# Patient Record
Sex: FEMALE | Race: WHITE | Employment: OTHER | URBAN - METROPOLITAN AREA
[De-identification: names, ages, dates, MRNs, and addresses within clinical notes are randomized per-mention and may not be internally consistent; named-entity substitution may affect disease eponyms.]

---

## 2017-01-01 ENCOUNTER — APPOINTMENT (OUTPATIENT)
Dept: GENERAL RADIOLOGY | Facility: HOSPITAL | Age: 82
DRG: 536 | End: 2017-01-01
Attending: EMERGENCY MEDICINE
Payer: MEDICARE

## 2017-01-01 ENCOUNTER — APPOINTMENT (OUTPATIENT)
Dept: CT IMAGING | Facility: HOSPITAL | Age: 82
DRG: 536 | End: 2017-01-01
Attending: EMERGENCY MEDICINE
Payer: MEDICARE

## 2017-01-01 ENCOUNTER — TELEPHONE (OUTPATIENT)
Dept: INTERNAL MEDICINE CLINIC | Facility: CLINIC | Age: 82
End: 2017-01-01

## 2017-01-01 ENCOUNTER — HOSPITAL ENCOUNTER (INPATIENT)
Facility: HOSPITAL | Age: 82
LOS: 4 days | Discharge: HOSPICE | DRG: 536 | End: 2017-01-01
Attending: EMERGENCY MEDICINE | Admitting: HOSPITALIST
Payer: MEDICARE

## 2017-01-01 ENCOUNTER — APPOINTMENT (OUTPATIENT)
Dept: CV DIAGNOSTICS | Facility: HOSPITAL | Age: 82
DRG: 536 | End: 2017-01-01
Attending: INTERNAL MEDICINE
Payer: MEDICARE

## 2017-01-01 ENCOUNTER — HOSPITAL ENCOUNTER (INPATIENT)
Facility: HOSPITAL | Age: 82
LOS: 1 days | DRG: 536 | End: 2017-01-01
Attending: HOSPITALIST | Admitting: HOSPITALIST
Payer: OTHER MISCELLANEOUS

## 2017-01-01 ENCOUNTER — OFFICE VISIT (OUTPATIENT)
Dept: INTERNAL MEDICINE CLINIC | Facility: CLINIC | Age: 82
End: 2017-01-01

## 2017-01-01 ENCOUNTER — APPOINTMENT (OUTPATIENT)
Dept: ULTRASOUND IMAGING | Facility: HOSPITAL | Age: 82
DRG: 536 | End: 2017-01-01
Attending: HOSPITALIST
Payer: MEDICARE

## 2017-01-01 VITALS
HEART RATE: 125 BPM | BODY MASS INDEX: 27.64 KG/M2 | DIASTOLIC BLOOD PRESSURE: 44 MMHG | SYSTOLIC BLOOD PRESSURE: 135 MMHG | RESPIRATION RATE: 20 BRPM | OXYGEN SATURATION: 93 % | TEMPERATURE: 98 F | WEIGHT: 172 LBS | HEIGHT: 66 IN

## 2017-01-01 VITALS
SYSTOLIC BLOOD PRESSURE: 124 MMHG | TEMPERATURE: 104 F | HEART RATE: 138 BPM | RESPIRATION RATE: 18 BRPM | DIASTOLIC BLOOD PRESSURE: 46 MMHG | OXYGEN SATURATION: 90 %

## 2017-01-01 VITALS
BODY MASS INDEX: 28.96 KG/M2 | HEART RATE: 58 BPM | DIASTOLIC BLOOD PRESSURE: 74 MMHG | HEIGHT: 65 IN | WEIGHT: 173.81 LBS | SYSTOLIC BLOOD PRESSURE: 146 MMHG

## 2017-01-01 DIAGNOSIS — R26.2 INABILITY TO AMBULATE DUE TO HIP: ICD-10-CM

## 2017-01-01 DIAGNOSIS — R29.6 FREQUENT FALLS: ICD-10-CM

## 2017-01-01 DIAGNOSIS — R54 FRAILTY: Primary | ICD-10-CM

## 2017-01-01 DIAGNOSIS — S32.401A CLOSED NONDISPLACED FRACTURE OF RIGHT ACETABULUM, UNSPECIFIED PORTION OF ACETABULUM, INITIAL ENCOUNTER (HCC): ICD-10-CM
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
## 2017-01-01 LAB
ANION GAP SERPL CALC-SCNC: 10 MMOL/L (ref 0–18)
ANION GAP SERPL CALC-SCNC: 13 MMOL/L (ref 0–18)
ANION GAP SERPL CALC-SCNC: 6 MMOL/L (ref 0–18)
ANION GAP SERPL CALC-SCNC: 7 MMOL/L (ref 0–18)
ANION GAP SERPL CALC-SCNC: 8 MMOL/L (ref 0–18)
ANION GAP SERPL CALC-SCNC: 9 MMOL/L (ref 0–18)
ANTIBODY SCREEN: NEGATIVE
APTT PPP: 22.4 SECONDS (ref 23.2–35.3)
APTT PPP: 24.7 SECONDS (ref 23.2–35.3)
BASOPHILS # BLD: 0 K/UL (ref 0–0.2)
BASOPHILS # BLD: 0.1 K/UL (ref 0–0.2)
BASOPHILS # BLD: 0.1 K/UL (ref 0–0.2)
BASOPHILS NFR BLD: 0 %
BASOPHILS NFR BLD: 0 %
BASOPHILS NFR BLD: 1 %
BILIRUB UR QL: NEGATIVE
BUN SERPL-MCNC: 13 MG/DL (ref 8–20)
BUN SERPL-MCNC: 15 MG/DL (ref 8–20)
BUN SERPL-MCNC: 15 MG/DL (ref 8–20)
BUN SERPL-MCNC: 18 MG/DL (ref 8–20)
BUN SERPL-MCNC: 23 MG/DL (ref 8–20)
BUN SERPL-MCNC: 28 MG/DL (ref 8–20)
BUN/CREAT SERPL: 20.6 (ref 10–20)
BUN/CREAT SERPL: 22.7 (ref 10–20)
BUN/CREAT SERPL: 23.4 (ref 10–20)
BUN/CREAT SERPL: 28.6 (ref 10–20)
BUN/CREAT SERPL: 31.1 (ref 10–20)
BUN/CREAT SERPL: 32.1 (ref 10–20)
CALCIUM SERPL-MCNC: 8.4 MG/DL (ref 8.5–10.5)
CALCIUM SERPL-MCNC: 8.5 MG/DL (ref 8.5–10.5)
CALCIUM SERPL-MCNC: 8.7 MG/DL (ref 8.5–10.5)
CALCIUM SERPL-MCNC: 9.1 MG/DL (ref 8.5–10.5)
CHLORIDE SERPL-SCNC: 103 MMOL/L (ref 95–110)
CHLORIDE SERPL-SCNC: 105 MMOL/L (ref 95–110)
CHLORIDE SERPL-SCNC: 105 MMOL/L (ref 95–110)
CHLORIDE SERPL-SCNC: 107 MMOL/L (ref 95–110)
CHLORIDE SERPL-SCNC: 108 MMOL/L (ref 95–110)
CHLORIDE SERPL-SCNC: 108 MMOL/L (ref 95–110)
CHOLEST SERPL-MCNC: 121 MG/DL (ref 110–200)
CLARITY UR: CLEAR
CO2 SERPL-SCNC: 19 MMOL/L (ref 22–32)
CO2 SERPL-SCNC: 24 MMOL/L (ref 22–32)
CO2 SERPL-SCNC: 25 MMOL/L (ref 22–32)
COLOR UR: YELLOW
CREAT SERPL-MCNC: 0.56 MG/DL (ref 0.5–1.5)
CREAT SERPL-MCNC: 0.63 MG/DL (ref 0.5–1.5)
CREAT SERPL-MCNC: 0.64 MG/DL (ref 0.5–1.5)
CREAT SERPL-MCNC: 0.66 MG/DL (ref 0.5–1.5)
CREAT SERPL-MCNC: 0.74 MG/DL (ref 0.5–1.5)
CREAT SERPL-MCNC: 0.98 MG/DL (ref 0.5–1.5)
EOSINOPHIL # BLD: 0 K/UL (ref 0–0.7)
EOSINOPHIL # BLD: 0 K/UL (ref 0–0.7)
EOSINOPHIL # BLD: 0.1 K/UL (ref 0–0.7)
EOSINOPHIL # BLD: 0.2 K/UL (ref 0–0.7)
EOSINOPHIL # BLD: 0.2 K/UL (ref 0–0.7)
EOSINOPHIL # BLD: 0.3 K/UL (ref 0–0.7)
EOSINOPHIL NFR BLD: 0 %
EOSINOPHIL NFR BLD: 0 %
EOSINOPHIL NFR BLD: 1 %
EOSINOPHIL NFR BLD: 2 %
EOSINOPHIL NFR BLD: 3 %
EOSINOPHIL NFR BLD: 4 %
ERYTHROCYTE [DISTWIDTH] IN BLOOD BY AUTOMATED COUNT: 15 % (ref 11–15)
ERYTHROCYTE [DISTWIDTH] IN BLOOD BY AUTOMATED COUNT: 15.1 % (ref 11–15)
ERYTHROCYTE [DISTWIDTH] IN BLOOD BY AUTOMATED COUNT: 15.1 % (ref 11–15)
ERYTHROCYTE [DISTWIDTH] IN BLOOD BY AUTOMATED COUNT: 15.2 % (ref 11–15)
ERYTHROCYTE [DISTWIDTH] IN BLOOD BY AUTOMATED COUNT: 15.4 % (ref 11–15)
ERYTHROCYTE [DISTWIDTH] IN BLOOD BY AUTOMATED COUNT: 15.5 % (ref 11–15)
GLUCOSE BLDC GLUCOMTR-MCNC: 110 MG/DL (ref 70–99)
GLUCOSE BLDC GLUCOMTR-MCNC: 113 MG/DL (ref 70–99)
GLUCOSE BLDC GLUCOMTR-MCNC: 115 MG/DL (ref 70–99)
GLUCOSE BLDC GLUCOMTR-MCNC: 116 MG/DL (ref 70–99)
GLUCOSE BLDC GLUCOMTR-MCNC: 116 MG/DL (ref 70–99)
GLUCOSE BLDC GLUCOMTR-MCNC: 117 MG/DL (ref 70–99)
GLUCOSE BLDC GLUCOMTR-MCNC: 118 MG/DL (ref 70–99)
GLUCOSE BLDC GLUCOMTR-MCNC: 118 MG/DL (ref 70–99)
GLUCOSE BLDC GLUCOMTR-MCNC: 120 MG/DL (ref 70–99)
GLUCOSE BLDC GLUCOMTR-MCNC: 120 MG/DL (ref 70–99)
GLUCOSE BLDC GLUCOMTR-MCNC: 123 MG/DL (ref 70–99)
GLUCOSE BLDC GLUCOMTR-MCNC: 128 MG/DL (ref 70–99)
GLUCOSE BLDC GLUCOMTR-MCNC: 130 MG/DL (ref 70–99)
GLUCOSE BLDC GLUCOMTR-MCNC: 135 MG/DL (ref 70–99)
GLUCOSE BLDC GLUCOMTR-MCNC: 96 MG/DL (ref 70–99)
GLUCOSE SERPL-MCNC: 108 MG/DL (ref 70–99)
GLUCOSE SERPL-MCNC: 120 MG/DL (ref 70–99)
GLUCOSE SERPL-MCNC: 134 MG/DL (ref 70–99)
GLUCOSE SERPL-MCNC: 163 MG/DL (ref 70–99)
GLUCOSE SERPL-MCNC: 165 MG/DL (ref 70–99)
GLUCOSE SERPL-MCNC: 191 MG/DL (ref 70–99)
HCT VFR BLD AUTO: 28.7 % (ref 35–48)
HCT VFR BLD AUTO: 30 % (ref 35–48)
HCT VFR BLD AUTO: 30 % (ref 35–48)
HCT VFR BLD AUTO: 30.1 % (ref 35–48)
HCT VFR BLD AUTO: 30.4 % (ref 35–48)
HCT VFR BLD AUTO: 37.4 % (ref 35–48)
HDLC SERPL-MCNC: 49 MG/DL
HGB BLD-MCNC: 10 G/DL (ref 12–16)
HGB BLD-MCNC: 10.1 G/DL (ref 12–16)
HGB BLD-MCNC: 12.2 G/DL (ref 12–16)
HGB BLD-MCNC: 9.3 G/DL (ref 12–16)
HGB BLD-MCNC: 9.8 G/DL (ref 12–16)
HGB BLD-MCNC: 9.9 G/DL (ref 12–16)
HGB UR QL STRIP.AUTO: NEGATIVE
INR BLD: 1 (ref 0.9–1.2)
INR BLD: 1.1 (ref 0.9–1.2)
KETONES UR-MCNC: NEGATIVE MG/DL
LDLC SERPL CALC-MCNC: 50 MG/DL (ref 0–99)
LEUKOCYTE ESTERASE UR QL STRIP.AUTO: NEGATIVE
LYMPHOCYTES # BLD: 0.3 K/UL (ref 1–4)
LYMPHOCYTES # BLD: 0.7 K/UL (ref 1–4)
LYMPHOCYTES # BLD: 0.8 K/UL (ref 1–4)
LYMPHOCYTES # BLD: 0.9 K/UL (ref 1–4)
LYMPHOCYTES # BLD: 1.1 K/UL (ref 1–4)
LYMPHOCYTES # BLD: 1.5 K/UL (ref 1–4)
LYMPHOCYTES NFR BLD: 10 %
LYMPHOCYTES NFR BLD: 16 %
LYMPHOCYTES NFR BLD: 3 %
LYMPHOCYTES NFR BLD: 7 %
LYMPHOCYTES NFR BLD: 9 %
LYMPHOCYTES NFR BLD: 9 %
MAGNESIUM SERPL-MCNC: 1.9 MG/DL (ref 1.8–2.5)
MCH RBC QN AUTO: 29.4 PG (ref 27–32)
MCH RBC QN AUTO: 29.5 PG (ref 27–32)
MCH RBC QN AUTO: 29.6 PG (ref 27–32)
MCH RBC QN AUTO: 29.9 PG (ref 27–32)
MCH RBC QN AUTO: 30.2 PG (ref 27–32)
MCH RBC QN AUTO: 30.2 PG (ref 27–32)
MCHC RBC AUTO-ENTMCNC: 32.5 G/DL (ref 32–37)
MCHC RBC AUTO-ENTMCNC: 32.5 G/DL (ref 32–37)
MCHC RBC AUTO-ENTMCNC: 32.6 G/DL (ref 32–37)
MCHC RBC AUTO-ENTMCNC: 33.1 G/DL (ref 32–37)
MCHC RBC AUTO-ENTMCNC: 33.3 G/DL (ref 32–37)
MCHC RBC AUTO-ENTMCNC: 33.3 G/DL (ref 32–37)
MCV RBC AUTO: 89.8 FL (ref 80–100)
MCV RBC AUTO: 90.1 FL (ref 80–100)
MCV RBC AUTO: 90.7 FL (ref 80–100)
MCV RBC AUTO: 90.7 FL (ref 80–100)
MCV RBC AUTO: 90.9 FL (ref 80–100)
MCV RBC AUTO: 91 FL (ref 80–100)
MONOCYTES # BLD: 0.6 K/UL (ref 0–1)
MONOCYTES # BLD: 0.7 K/UL (ref 0–1)
MONOCYTES # BLD: 0.9 K/UL (ref 0–1)
MONOCYTES # BLD: 0.9 K/UL (ref 0–1)
MONOCYTES # BLD: 1 K/UL (ref 0–1)
MONOCYTES # BLD: 1.1 K/UL (ref 0–1)
MONOCYTES NFR BLD: 10 %
MONOCYTES NFR BLD: 10 %
MONOCYTES NFR BLD: 7 %
MONOCYTES NFR BLD: 7 %
MONOCYTES NFR BLD: 8 %
MONOCYTES NFR BLD: 9 %
MRSA NASAL: NEGATIVE
NEUTROPHILS # BLD AUTO: 11.9 K/UL (ref 1.8–7.7)
NEUTROPHILS # BLD AUTO: 13.3 K/UL (ref 1.8–7.7)
NEUTROPHILS # BLD AUTO: 5 K/UL (ref 1.8–7.7)
NEUTROPHILS # BLD AUTO: 5.4 K/UL (ref 1.8–7.7)
NEUTROPHILS # BLD AUTO: 7.6 K/UL (ref 1.8–7.7)
NEUTROPHILS # BLD AUTO: 9.9 K/UL (ref 1.8–7.7)
NEUTROPHILS NFR BLD: 70 %
NEUTROPHILS NFR BLD: 77 %
NEUTROPHILS NFR BLD: 79 %
NEUTROPHILS NFR BLD: 82 %
NEUTROPHILS NFR BLD: 85 %
NEUTROPHILS NFR BLD: 91 %
NITRITE UR QL STRIP.AUTO: NEGATIVE
NONHDLC SERPL-MCNC: 72 MG/DL
OSMOLALITY UR CALC.SUM OF ELEC: 288 MOSM/KG (ref 275–295)
OSMOLALITY UR CALC.SUM OF ELEC: 288 MOSM/KG (ref 275–295)
OSMOLALITY UR CALC.SUM OF ELEC: 289 MOSM/KG (ref 275–295)
OSMOLALITY UR CALC.SUM OF ELEC: 291 MOSM/KG (ref 275–295)
OSMOLALITY UR CALC.SUM OF ELEC: 291 MOSM/KG (ref 275–295)
OSMOLALITY UR CALC.SUM OF ELEC: 299 MOSM/KG (ref 275–295)
PH UR: 5 [PH] (ref 5–8)
PLATELET # BLD AUTO: 160 K/UL (ref 140–400)
PLATELET # BLD AUTO: 166 K/UL (ref 140–400)
PLATELET # BLD AUTO: 172 K/UL (ref 140–400)
PLATELET # BLD AUTO: 196 K/UL (ref 140–400)
PLATELET # BLD AUTO: 245 K/UL (ref 140–400)
PLATELET # BLD AUTO: 256 K/UL (ref 140–400)
PMV BLD AUTO: 8.8 FL (ref 7.4–10.3)
PMV BLD AUTO: 8.9 FL (ref 7.4–10.3)
PMV BLD AUTO: 9 FL (ref 7.4–10.3)
PMV BLD AUTO: 9.1 FL (ref 7.4–10.3)
PMV BLD AUTO: 9.1 FL (ref 7.4–10.3)
PMV BLD AUTO: 9.3 FL (ref 7.4–10.3)
POTASSIUM SERPL-SCNC: 3.2 MMOL/L (ref 3.3–5.1)
POTASSIUM SERPL-SCNC: 3.7 MMOL/L (ref 3.3–5.1)
POTASSIUM SERPL-SCNC: 3.7 MMOL/L (ref 3.3–5.1)
POTASSIUM SERPL-SCNC: 3.9 MMOL/L (ref 3.3–5.1)
POTASSIUM SERPL-SCNC: 4 MMOL/L (ref 3.3–5.1)
POTASSIUM SERPL-SCNC: 4 MMOL/L (ref 3.3–5.1)
POTASSIUM SERPL-SCNC: 4.6 MMOL/L (ref 3.3–5.1)
PROT UR-MCNC: NEGATIVE MG/DL
PROTHROMBIN TIME: 12.7 SECONDS (ref 11.8–14.5)
PROTHROMBIN TIME: 13.5 SECONDS (ref 11.8–14.5)
RBC # BLD AUTO: 3.16 M/UL (ref 3.7–5.4)
RBC # BLD AUTO: 3.29 M/UL (ref 3.7–5.4)
RBC # BLD AUTO: 3.31 M/UL (ref 3.7–5.4)
RBC # BLD AUTO: 3.31 M/UL (ref 3.7–5.4)
RBC # BLD AUTO: 3.39 M/UL (ref 3.7–5.4)
RBC # BLD AUTO: 4.15 M/UL (ref 3.7–5.4)
RBC #/AREA URNS AUTO: 1 /HPF
RH BLOOD TYPE: POSITIVE
SODIUM SERPL-SCNC: 137 MMOL/L (ref 136–144)
SODIUM SERPL-SCNC: 137 MMOL/L (ref 136–144)
SODIUM SERPL-SCNC: 138 MMOL/L (ref 136–144)
SODIUM SERPL-SCNC: 139 MMOL/L (ref 136–144)
SP GR UR STRIP: 1.02 (ref 1–1.03)
STAPH A BY PCR: POSITIVE
TRIGL SERPL-MCNC: 112 MG/DL (ref 1–149)
TROPONIN I SERPL-MCNC: 0.01 NG/ML (ref ?–0.03)
TROPONIN I SERPL-MCNC: 0.09 NG/ML (ref ?–0.03)
TROPONIN I SERPL-MCNC: 0.13 NG/ML (ref ?–0.03)
UROBILINOGEN UR STRIP-ACNC: <2
VIT C UR-MCNC: 40 MG/DL
WBC # BLD AUTO: 11.7 K/UL (ref 4–11)
WBC # BLD AUTO: 13.2 K/UL (ref 4–11)
WBC # BLD AUTO: 16.2 K/UL (ref 4–11)
WBC # BLD AUTO: 7 K/UL (ref 4–11)
WBC # BLD AUTO: 7.2 K/UL (ref 4–11)
WBC # BLD AUTO: 9.6 K/UL (ref 4–11)
WBC #/AREA URNS AUTO: 1 /HPF

## 2017-01-01 PROCEDURE — 99239 HOSP IP/OBS DSCHRG MGMT >30: CPT | Performed by: HOSPITALIST

## 2017-01-01 PROCEDURE — 99214 OFFICE O/P EST MOD 30 MIN: CPT | Performed by: INTERNAL MEDICINE

## 2017-01-01 PROCEDURE — 99233 SBSQ HOSP IP/OBS HIGH 50: CPT | Performed by: HOSPITALIST

## 2017-01-01 PROCEDURE — 72125 CT NECK SPINE W/O DYE: CPT

## 2017-01-01 PROCEDURE — 99223 1ST HOSP IP/OBS HIGH 75: CPT | Performed by: HOSPITALIST

## 2017-01-01 PROCEDURE — 93306 TTE W/DOPPLER COMPLETE: CPT | Performed by: INTERNAL MEDICINE

## 2017-01-01 PROCEDURE — 72170 X-RAY EXAM OF PELVIS: CPT

## 2017-01-01 PROCEDURE — 72192 CT PELVIS W/O DYE: CPT

## 2017-01-01 PROCEDURE — G0463 HOSPITAL OUTPT CLINIC VISIT: HCPCS | Performed by: INTERNAL MEDICINE

## 2017-01-01 PROCEDURE — 70450 CT HEAD/BRAIN W/O DYE: CPT

## 2017-01-01 PROCEDURE — 93923 UPR/LXTR ART STDY 3+ LVLS: CPT

## 2017-01-01 PROCEDURE — 71010 XR CHEST AP PORTABLE  (CPT=71010): CPT

## 2017-01-01 PROCEDURE — 93306 TTE W/DOPPLER COMPLETE: CPT

## 2017-01-01 PROCEDURE — 99222 1ST HOSP IP/OBS MODERATE 55: CPT | Performed by: HOSPITALIST

## 2017-01-01 RX ORDER — SODIUM PHOSPHATE, DIBASIC AND SODIUM PHOSPHATE, MONOBASIC 7; 19 G/133ML; G/133ML
1 ENEMA RECTAL ONCE AS NEEDED
Status: ACTIVE | OUTPATIENT
Start: 2017-01-01 | End: 2017-01-01

## 2017-01-01 RX ORDER — SODIUM CHLORIDE 9 MG/ML
INJECTION, SOLUTION INTRAVENOUS CONTINUOUS
Status: DISCONTINUED | OUTPATIENT
Start: 2017-01-01 | End: 2017-01-01

## 2017-01-01 RX ORDER — LORAZEPAM 2 MG/ML
2 INJECTION INTRAMUSCULAR EVERY 4 HOURS PRN
Status: DISCONTINUED | OUTPATIENT
Start: 2017-01-01 | End: 2017-01-01

## 2017-01-01 RX ORDER — SODIUM CHLORIDE 0.9 % (FLUSH) 0.9 %
SYRINGE (ML) INJECTION
Status: COMPLETED
Start: 2017-01-01 | End: 2017-01-01

## 2017-01-01 RX ORDER — ACETAMINOPHEN 160 MG/5ML
650 SOLUTION ORAL EVERY 6 HOURS PRN
Status: DISCONTINUED | OUTPATIENT
Start: 2017-01-01 | End: 2017-01-01

## 2017-01-01 RX ORDER — SODIUM CHLORIDE 9 MG/ML
INJECTION, SOLUTION INTRAVENOUS
Status: COMPLETED
Start: 2017-01-01 | End: 2017-01-01

## 2017-01-01 RX ORDER — DEXTROSE MONOHYDRATE 25 G/50ML
50 INJECTION, SOLUTION INTRAVENOUS AS NEEDED
Status: DISCONTINUED | OUTPATIENT
Start: 2017-01-01 | End: 2017-01-01

## 2017-01-01 RX ORDER — SODIUM CHLORIDE 0.9 % (FLUSH) 0.9 %
10 SYRINGE (ML) INJECTION AS NEEDED
Status: DISCONTINUED | OUTPATIENT
Start: 2017-01-01 | End: 2017-01-01

## 2017-01-01 RX ORDER — HYDROMORPHONE HYDROCHLORIDE 1 MG/ML
0.5 INJECTION, SOLUTION INTRAMUSCULAR; INTRAVENOUS; SUBCUTANEOUS ONCE
Status: COMPLETED | OUTPATIENT
Start: 2017-01-01 | End: 2017-01-01

## 2017-01-01 RX ORDER — HYDROMORPHONE HYDROCHLORIDE 1 MG/ML
1 INJECTION, SOLUTION INTRAMUSCULAR; INTRAVENOUS; SUBCUTANEOUS EVERY 2 HOUR PRN
Status: DISCONTINUED | OUTPATIENT
Start: 2017-01-01 | End: 2017-01-01

## 2017-01-01 RX ORDER — ALBUTEROL SULFATE 2.5 MG/3ML
2.5 SOLUTION RESPIRATORY (INHALATION) EVERY 4 HOURS PRN
Status: CANCELLED | OUTPATIENT
Start: 2017-01-01

## 2017-01-01 RX ORDER — FUROSEMIDE 40 MG/1
40 TABLET ORAL EVERY 8 HOURS PRN
Status: DISCONTINUED | OUTPATIENT
Start: 2017-01-01 | End: 2017-01-01

## 2017-01-01 RX ORDER — HEPARIN SODIUM AND DEXTROSE 10000; 5 [USP'U]/100ML; G/100ML
INJECTION INTRAVENOUS CONTINUOUS
Status: DISCONTINUED | OUTPATIENT
Start: 2017-01-01 | End: 2017-01-01

## 2017-01-01 RX ORDER — LORAZEPAM 2 MG/ML
1 INJECTION INTRAMUSCULAR EVERY 4 HOURS PRN
Status: CANCELLED | OUTPATIENT
Start: 2017-01-01

## 2017-01-01 RX ORDER — HYDROMORPHONE HYDROCHLORIDE 1 MG/ML
1 INJECTION, SOLUTION INTRAMUSCULAR; INTRAVENOUS; SUBCUTANEOUS ONCE
Status: COMPLETED | OUTPATIENT
Start: 2017-01-01 | End: 2017-01-01

## 2017-01-01 RX ORDER — ALBUTEROL SULFATE 2.5 MG/3ML
2.5 SOLUTION RESPIRATORY (INHALATION) EVERY 4 HOURS PRN
Status: DISCONTINUED | OUTPATIENT
Start: 2017-01-01 | End: 2017-01-01

## 2017-01-01 RX ORDER — BISACODYL 10 MG
10 SUPPOSITORY, RECTAL RECTAL
Status: DISCONTINUED | OUTPATIENT
Start: 2017-01-01 | End: 2017-01-01

## 2017-01-01 RX ORDER — DIPHENHYDRAMINE HCL 25 MG
25 CAPSULE ORAL EVERY 4 HOURS PRN
Status: DISCONTINUED | OUTPATIENT
Start: 2017-01-01 | End: 2017-01-01

## 2017-01-01 RX ORDER — ACETAMINOPHEN 160 MG/5ML
650 SOLUTION ORAL EVERY 6 HOURS PRN
Status: CANCELLED | OUTPATIENT
Start: 2017-01-01

## 2017-01-01 RX ORDER — ATROPINE SULFATE 10 MG/ML
2 SOLUTION/ DROPS OPHTHALMIC EVERY 2 HOUR PRN
Status: DISCONTINUED | OUTPATIENT
Start: 2017-01-01 | End: 2017-01-01

## 2017-01-01 RX ORDER — SODIUM PHOSPHATE, DIBASIC AND SODIUM PHOSPHATE, MONOBASIC 7; 19 G/133ML; G/133ML
1 ENEMA RECTAL ONCE AS NEEDED
Status: DISCONTINUED | OUTPATIENT
Start: 2017-01-01 | End: 2017-01-01

## 2017-01-01 RX ORDER — SENNOSIDES 8.6 MG
650 CAPSULE ORAL 2 TIMES DAILY
Status: ON HOLD | COMMUNITY
End: 2017-01-01

## 2017-01-01 RX ORDER — SCOLOPAMINE TRANSDERMAL SYSTEM 1 MG/1
1 PATCH, EXTENDED RELEASE TRANSDERMAL
Status: CANCELLED | OUTPATIENT
Start: 2017-01-11

## 2017-01-01 RX ORDER — FUROSEMIDE 10 MG/ML
40 INJECTION INTRAMUSCULAR; INTRAVENOUS EVERY 8 HOURS PRN
Status: DISCONTINUED | OUTPATIENT
Start: 2017-01-01 | End: 2017-01-01

## 2017-01-01 RX ORDER — ONDANSETRON 2 MG/ML
4 INJECTION INTRAMUSCULAR; INTRAVENOUS EVERY 6 HOURS PRN
Status: CANCELLED | OUTPATIENT
Start: 2017-01-01

## 2017-01-01 RX ORDER — ONDANSETRON 4 MG/1
4 TABLET, ORALLY DISINTEGRATING ORAL EVERY 6 HOURS PRN
Status: CANCELLED | OUTPATIENT
Start: 2017-01-01

## 2017-01-01 RX ORDER — ACETAMINOPHEN 650 MG/1
650 SUPPOSITORY RECTAL EVERY 6 HOURS PRN
Status: DISCONTINUED | OUTPATIENT
Start: 2017-01-01 | End: 2017-01-01

## 2017-01-01 RX ORDER — POLYETHYLENE GLYCOL 3350 17 G/17G
17 POWDER, FOR SOLUTION ORAL DAILY PRN
Status: DISCONTINUED | OUTPATIENT
Start: 2017-01-01 | End: 2017-01-01

## 2017-01-01 RX ORDER — HEPARIN SODIUM 1000 [USP'U]/ML
80 INJECTION, SOLUTION INTRAVENOUS; SUBCUTANEOUS ONCE
Status: DISCONTINUED | OUTPATIENT
Start: 2017-01-01 | End: 2017-01-01

## 2017-01-01 RX ORDER — ONDANSETRON 2 MG/ML
4 INJECTION INTRAMUSCULAR; INTRAVENOUS EVERY 6 HOURS PRN
Status: DISCONTINUED | OUTPATIENT
Start: 2017-01-01 | End: 2017-01-01

## 2017-01-01 RX ORDER — ACETAMINOPHEN 650 MG/1
650 SUPPOSITORY RECTAL EVERY 6 HOURS SCHEDULED
Status: DISCONTINUED | OUTPATIENT
Start: 2017-01-01 | End: 2017-01-01

## 2017-01-01 RX ORDER — ATROPINE SULFATE 10 MG/ML
2 SOLUTION/ DROPS OPHTHALMIC EVERY 2 HOUR PRN
Status: CANCELLED | OUTPATIENT
Start: 2017-01-01

## 2017-01-01 RX ORDER — DIPHENHYDRAMINE HCL 25 MG
25 CAPSULE ORAL EVERY 4 HOURS PRN
Status: CANCELLED | OUTPATIENT
Start: 2017-01-01

## 2017-01-01 RX ORDER — DIAZEPAM 5 MG/ML
5 INJECTION, SOLUTION INTRAMUSCULAR; INTRAVENOUS EVERY 6 HOURS PRN
Status: DISCONTINUED | OUTPATIENT
Start: 2017-01-01 | End: 2017-01-01

## 2017-01-01 RX ORDER — SODIUM CHLORIDE 0.9 % (FLUSH) 0.9 %
SYRINGE (ML) INJECTION
Status: DISCONTINUED
Start: 2017-01-01 | End: 2017-01-01

## 2017-01-01 RX ORDER — ACETAMINOPHEN 160 MG/5ML
650 SOLUTION ORAL EVERY 6 HOURS SCHEDULED
Status: DISCONTINUED | OUTPATIENT
Start: 2017-01-01 | End: 2017-01-01

## 2017-01-01 RX ORDER — LORAZEPAM 2 MG/ML
1 INJECTION INTRAMUSCULAR ONCE
Status: COMPLETED | OUTPATIENT
Start: 2017-01-01 | End: 2017-01-01

## 2017-01-01 RX ORDER — HYDROMORPHONE HYDROCHLORIDE 1 MG/ML
2 INJECTION, SOLUTION INTRAMUSCULAR; INTRAVENOUS; SUBCUTANEOUS EVERY 2 HOUR PRN
Status: DISCONTINUED | OUTPATIENT
Start: 2017-01-01 | End: 2017-01-01

## 2017-01-01 RX ORDER — HEPARIN SODIUM 5000 [USP'U]/ML
5000 INJECTION, SOLUTION INTRAVENOUS; SUBCUTANEOUS EVERY 12 HOURS
Status: DISCONTINUED | OUTPATIENT
Start: 2017-01-01 | End: 2017-01-01

## 2017-01-01 RX ORDER — SCOLOPAMINE TRANSDERMAL SYSTEM 1 MG/1
1 PATCH, EXTENDED RELEASE TRANSDERMAL
Status: DISCONTINUED | OUTPATIENT
Start: 2017-01-11 | End: 2017-01-01

## 2017-01-01 RX ORDER — ONDANSETRON 4 MG/1
4 TABLET, ORALLY DISINTEGRATING ORAL EVERY 6 HOURS PRN
Status: DISCONTINUED | OUTPATIENT
Start: 2017-01-01 | End: 2017-01-01

## 2017-01-01 RX ORDER — ACETAMINOPHEN 325 MG/1
650 TABLET ORAL EVERY 6 HOURS PRN
Status: DISCONTINUED | OUTPATIENT
Start: 2017-01-01 | End: 2017-01-01

## 2017-01-01 RX ORDER — HYDROMORPHONE HYDROCHLORIDE 1 MG/ML
0.3 INJECTION, SOLUTION INTRAMUSCULAR; INTRAVENOUS; SUBCUTANEOUS
Status: DISCONTINUED | OUTPATIENT
Start: 2017-01-01 | End: 2017-01-01

## 2017-01-01 RX ORDER — LORAZEPAM 2 MG/ML
1 INJECTION INTRAMUSCULAR EVERY 4 HOURS PRN
Status: DISCONTINUED | OUTPATIENT
Start: 2017-01-01 | End: 2017-01-01

## 2017-01-01 RX ORDER — ACETAMINOPHEN 650 MG/1
650 SUPPOSITORY RECTAL EVERY 6 HOURS PRN
Status: CANCELLED | OUTPATIENT
Start: 2017-01-01

## 2017-01-01 RX ORDER — GLUCOSAMINE SULFATE 500 MG
1000 CAPSULE ORAL DAILY
Status: ON HOLD | COMMUNITY
End: 2017-01-01

## 2017-01-01 RX ORDER — FUROSEMIDE 10 MG/ML
40 INJECTION INTRAMUSCULAR; INTRAVENOUS EVERY 8 HOURS PRN
Status: CANCELLED | OUTPATIENT
Start: 2017-01-01

## 2017-01-01 RX ORDER — LORAZEPAM 2 MG/ML
0.5 INJECTION INTRAMUSCULAR EVERY 4 HOURS PRN
Status: CANCELLED | OUTPATIENT
Start: 2017-01-01

## 2017-01-01 RX ORDER — HYDROCODONE BITARTRATE AND ACETAMINOPHEN 5; 325 MG/1; MG/1
2 TABLET ORAL EVERY 6 HOURS PRN
Status: DISCONTINUED | OUTPATIENT
Start: 2017-01-01 | End: 2017-01-01

## 2017-01-01 RX ORDER — SCOLOPAMINE TRANSDERMAL SYSTEM 1 MG/1
1 PATCH, EXTENDED RELEASE TRANSDERMAL
Status: DISCONTINUED | OUTPATIENT
Start: 2017-01-01 | End: 2017-01-01

## 2017-01-01 RX ORDER — LORAZEPAM 2 MG/ML
0.5 INJECTION INTRAMUSCULAR EVERY 4 HOURS PRN
Status: DISCONTINUED | OUTPATIENT
Start: 2017-01-01 | End: 2017-01-01

## 2017-01-01 RX ORDER — HYDROMORPHONE HYDROCHLORIDE 1 MG/ML
0.5 INJECTION, SOLUTION INTRAMUSCULAR; INTRAVENOUS; SUBCUTANEOUS
Status: DISCONTINUED | OUTPATIENT
Start: 2017-01-01 | End: 2017-01-01

## 2017-01-01 RX ORDER — HYDROMORPHONE HYDROCHLORIDE 1 MG/ML
2 INJECTION, SOLUTION INTRAMUSCULAR; INTRAVENOUS; SUBCUTANEOUS EVERY 2 HOUR PRN
Status: CANCELLED | OUTPATIENT
Start: 2017-01-01

## 2017-01-01 RX ORDER — HYDROMORPHONE HYDROCHLORIDE 1 MG/ML
1 INJECTION, SOLUTION INTRAMUSCULAR; INTRAVENOUS; SUBCUTANEOUS EVERY 2 HOUR PRN
Status: CANCELLED | OUTPATIENT
Start: 2017-01-01

## 2017-01-01 RX ORDER — HYDROCODONE BITARTRATE AND ACETAMINOPHEN 5; 325 MG/1; MG/1
1 TABLET ORAL EVERY 6 HOURS PRN
Status: DISCONTINUED | OUTPATIENT
Start: 2017-01-01 | End: 2017-01-01

## 2017-01-01 RX ORDER — POTASSIUM CHLORIDE 14.9 MG/ML
20 INJECTION INTRAVENOUS ONCE
Status: COMPLETED | OUTPATIENT
Start: 2017-01-01 | End: 2017-01-01

## 2017-01-01 RX ORDER — VALSARTAN AND HYDROCHLOROTHIAZIDE 320; 12.5 MG/1; MG/1
1 TABLET, FILM COATED ORAL DAILY
Status: DISCONTINUED | OUTPATIENT
Start: 2017-01-01 | End: 2017-01-01 | Stop reason: RX

## 2017-01-01 RX ORDER — POTASSIUM CHLORIDE 20 MEQ/1
40 TABLET, EXTENDED RELEASE ORAL ONCE
Status: COMPLETED | OUTPATIENT
Start: 2017-01-01 | End: 2017-01-01

## 2017-01-01 RX ORDER — HEPARIN SODIUM AND DEXTROSE 10000; 5 [USP'U]/100ML; G/100ML
18 INJECTION INTRAVENOUS ONCE
Status: DISCONTINUED | OUTPATIENT
Start: 2017-01-01 | End: 2017-01-01

## 2017-01-01 RX ORDER — DEXTROSE AND SODIUM CHLORIDE 5; .45 G/100ML; G/100ML
INJECTION, SOLUTION INTRAVENOUS ONCE
Status: COMPLETED | OUTPATIENT
Start: 2017-01-01 | End: 2017-01-01

## 2017-01-01 RX ORDER — AMLODIPINE BESYLATE 10 MG/1
10 TABLET ORAL DAILY
Status: DISCONTINUED | OUTPATIENT
Start: 2017-01-01 | End: 2017-01-01

## 2017-01-01 RX ORDER — BISACODYL 10 MG
10 SUPPOSITORY, RECTAL RECTAL
Status: CANCELLED | OUTPATIENT
Start: 2017-01-01

## 2017-01-01 RX ORDER — MULTIPLE VITAMINS W/ MINERALS TAB 9MG-400MCG
1 TAB ORAL DAILY
Status: DISCONTINUED | OUTPATIENT
Start: 2017-01-01 | End: 2017-01-01

## 2017-01-01 RX ORDER — FUROSEMIDE 40 MG/1
40 TABLET ORAL EVERY 8 HOURS PRN
Status: CANCELLED | OUTPATIENT
Start: 2017-01-01

## 2017-01-01 RX ORDER — DIAZEPAM 5 MG/ML
2.5 INJECTION, SOLUTION INTRAMUSCULAR; INTRAVENOUS EVERY 6 HOURS PRN
Status: DISCONTINUED | OUTPATIENT
Start: 2017-01-01 | End: 2017-01-01

## 2017-01-01 RX ORDER — ASPIRIN 325 MG
325 TABLET ORAL DAILY
Status: DISCONTINUED | OUTPATIENT
Start: 2017-01-01 | End: 2017-01-01

## 2017-01-01 RX ORDER — SODIUM CHLORIDE 0.9 % (FLUSH) 0.9 %
SYRINGE (ML) INJECTION
Status: DISPENSED
Start: 2017-01-01 | End: 2017-01-01

## 2017-01-01 RX ORDER — SODIUM CHLORIDE 0.9 % (FLUSH) 0.9 %
10 SYRINGE (ML) INJECTION AS NEEDED
Status: CANCELLED | OUTPATIENT
Start: 2017-01-01

## 2017-01-01 RX ORDER — ATORVASTATIN CALCIUM 20 MG/1
20 TABLET, FILM COATED ORAL NIGHTLY
Status: DISCONTINUED | OUTPATIENT
Start: 2017-01-01 | End: 2017-01-01

## 2017-01-01 RX ORDER — LORAZEPAM 2 MG/ML
2 INJECTION INTRAMUSCULAR EVERY 4 HOURS PRN
Status: CANCELLED | OUTPATIENT
Start: 2017-01-01

## 2017-01-01 RX ORDER — DIAZEPAM 5 MG/ML
5 INJECTION, SOLUTION INTRAMUSCULAR; INTRAVENOUS EVERY 6 HOURS PRN
Status: CANCELLED | OUTPATIENT
Start: 2017-01-01

## 2017-01-01 RX ORDER — SODIUM PHOSPHATE, DIBASIC AND SODIUM PHOSPHATE, MONOBASIC 7; 19 G/133ML; G/133ML
1 ENEMA RECTAL ONCE AS NEEDED
Status: CANCELLED | OUTPATIENT
Start: 2017-01-01 | End: 2017-01-01

## 2017-01-01 RX ORDER — POLYVINYL ALCOHOL 14 MG/ML
2 SOLUTION/ DROPS OPHTHALMIC
Status: DISCONTINUED | OUTPATIENT
Start: 2017-01-01 | End: 2017-01-01

## 2017-01-03 NOTE — PROGRESS NOTES
Radu Maxwell is a 80year old female. Patient presents with:  ER F/U: Pt was seen at the ER on 12/25 due to a fall    HPI:   Ms. Hugo Ball presents this morning, accompanied by her , for hospital follow-up.     On December 25, while attempting t needed. Disp: 20 tablet Rfl: 0   Buffered Aspirin 325 MG Oral Tab Take 1 tablet (325 mg total) by mouth daily.  Disp: 30 tablet Rfl: 0   METOPROLOL TARTRATE 100 MG Oral Tab TAKE ONE TABLET BY MOUTH TWICE DAILY Disp: 180 tablet Rfl: 1   Valsartan-Hydrochloro m)  Wt 173 lb 12.8 oz (78.835 kg)  BMI 28.92 kg/m2  LUNGS: Resonant to percussion and clear to auscultation without crackles or wheezes  CARDIAC: Rhythm irregularly irregular S1 S2 normal with harsh aortic systolic murmur, with 1-1+ pitting edema in the [facility]

## 2017-01-03 NOTE — PATIENT INSTRUCTIONS
Continue aspirin and remain off warfarin. Continue your other medications. Continue to use a walker always when walking. Return visit in 3 months.

## 2017-01-04 PROBLEM — R26.2 INABILITY TO AMBULATE DUE TO HIP: Status: ACTIVE | Noted: 2017-01-01
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
## 2017-01-04 PROBLEM — S32.401A CLOSED NONDISPLACED FRACTURE OF RIGHT ACETABULUM, UNSPECIFIED PORTION OF ACETABULUM, INITIAL ENCOUNTER (HCC): Status: ACTIVE | Noted: 2017-01-01

## 2017-01-04 NOTE — BH PROGRESS NOTE
ADMISSION NOTE    80year old female with h/o severe AS, Afib dm htn multiple falls presents with fall with hip pain and chest pain. Available medical records partially reviewed. Dictation to follow.     Carlos Gomez M.D.  1/4/2017

## 2017-01-04 NOTE — ED PROVIDER NOTES
Patient Seen in: [facility] AND [facility] Emergency Department    History   Patient presents with:  Fall (musculoskeletal, neurologic)    Stated Complaint: fall     HPI    79 yo F with PMH CAD, aortic stenosis, DM, HTN, HL, afib off warfarin secondary to recurren HYDROcodone-acetaminophen 5-325 MG Oral Tab,  Take 1 tablet by mouth every 4 (four) hours as needed. Buffered Aspirin 325 MG Oral Tab,  Take 1 tablet (325 mg total) by mouth daily.    METOPROLOL TARTRATE 100 MG Oral Tab,  TAKE ONE TABLET BY MOUTH TWICE DA equally reactive. Neck: Neck supple. In c-collar; no midline c-spine tenderness/stepoff/deformity. Cardiovascular: RRR. Pulmonary/Chest: Effort normal. CTAB. Nontender. Abdominal: Soft. Nontender.   Musculoskeletal: RLE shortened/rotated, 1+ DP/PT with In process                   Please view results for these tests on the individual orders.    BLOOD TYPE, ABO AND RH D   ANTIBODY SCREEN   RAINBOW DRAW GOLD   RAINBOW DRAW DARK GREEN   RAINBOW DRAW LAVENDER TALL (BNP)     Ct Brain Or Head ( (xdl=27545)    1/3/2017  PROCEDURE: CT SPINE CERVICAL (CPT=72125)  COMPARISON: [facility], CT SPINE CERVICAL (CPT=72125), 12/25/2016, 11:03. INDICATIONS: Fall, head and neck trauma. Evaluate for injury.   TECHNIQUE: Multi-planar CT images w pneumonia. Diffuse interstitial thickening noted throughout both lungs which can be seen with atypical infection, edema, or lung fibrosis. Appears similar to prior. Mild to moderate cardiomegaly, exaggerated by AP projection.   Moderate tortuosity and a margins. Possible mild thickening is incidentally noted of a loop of small bowel in the right lower quadrant. Adjacent possible soft tissue stranding is seen. Appearance is concerning for possible incidental enteritis.       Severe degenerative changes a pain with secondary limitation of AROM. CT obtained and notable for nondisplaced acetabular fracture. Given same, will admit to [address] [place] [address] coverage Dr. Matthew Jacobs for PCP Dr. Bernardino Barry with primary ortho Dr. Jean Bates notified of CT-demonstrated acetabular fracture.     Paul Gardner

## 2017-01-04 NOTE — H&P
[facility]    PATIENT'S NAME: Terri RAMON   ATTENDING PHYSICIAN: Darek Rivas MD   PATIENT ACCOUNT#:   [de-identified]    LOCATION:  [facility]  MEDICAL RECORD #:   [id number]       YOB: 1933  ADMISSION DATE:       01/03/2017    H dobutamine stress echo, which was normal in 2012. Severe aortic stenosis, echo in March of last year revealed an ejection fraction of 55% to 60%. Atrial fibrillation, not on anticoagulation due to frequent falls.   Type 2 diabetes mellitus, apparently die The patient reports her appetite has been good. She denies any fevers or chills. No nausea or vomiting. Otherwise, no additional pertinent positives or negatives on the 12-point review of systems except as listed in the History of Present Illness. of 24.  Her white count was 16.2 with a hemoglobin of 12.2 and a platelet count of 689,447. There were 82% neutrophils and 9 lymphocytes. Initial troponin was 0.01, second troponin was 0.09, third troponin was 0.13. EKG was previously mentioned. MD  d: 01/04/2017 09:40:03  t: 01/04/2017 10:52:58  [facility] [id number]  JUDY/

## 2017-01-04 NOTE — H&P
ASSESSMENT/PLAN:     impression:     1. Right hip fracture post fall  2. Severe aortic stenosis with chronic atrial fibrillation.   She has known coronary artery disease and now has an elevated troponin with some chest pain last night  3 hypertension RAINBOW DRAW DARK GREEN; Standing  -     RAINBOW DRAW LAVENDER TALL (BNP);  Standing  -     EKG 12 Lead Once; Standing  -     Troponin I, 0 Hour; Standing  -     Type and screen STAT; Standing  -     Type and screen STAT; Standing  -     CT PELVIS (CPT= into the vein every 3 (three) hours as needed for severe pain.    -     Continue villar catheter Once; Standing  -     Cancel: Remote Telemetry; Standing  -     TELE BOX ORDER; Standing  -     Cardiac monitoring Continuous; Standing  -     TROPONIN I, 2 HOUR once.   -     Cardiac diet Calorie Restriction/Carb Controlled: 1800 kcal/60 grams; Standing  -     Hypoglycemia Protocol Until Discontinued; Standing  -     Nursing communication - home diabetic meds; Standing  -     Patient education (specify) Once; Bhupinder Azar • Lumbar spinal stenosis      MRI April 2005   • Osteoarthritis    • Macular degeneration      Legal blindness.  Avastin injection   • Obesity    • Embolism, arterial, leg, right [facility]) November 2014     Severe ischemia RLE s/p right femoral and tibial art NEURO/PSYCH:alert and oriented. Normal affect. CV: PALP:PMI not displaced; no lifts, thrills or rubs. AUSC: Irregular rhythm, normal S1, S2 without S3; diminished A2 with harsh systolic ejection murmur.  CAROTIDS:carotid pulses normal. ABD AORTA:not enla

## 2017-01-04 NOTE — PROGRESS NOTES
ADDENDUM: please see Dr. Rolon Counter H&P from earlier today. D/w Dr. Reyna Lino who recommends holding off on surgery due to very high cardiac risk. Will con't pain control and await ortho recommendations. Await echo.  D/w Dr. Merced Aguilar, will hold off on surgery for

## 2017-01-04 NOTE — CONSULTS
[facility]    PATIENT'S NAME: Nitesh Ryan YENNY   ATTENDING PHYSICIAN: Jimi Nougeira MD   CONSULTING PHYSICIAN: Noa Oliver.  MD Tawana   PATIENT ACCOUNT#:   [de-identified]    LOCATION:  [facility]  MEDICAL RECORD #:   [id number]       DATE OF BIRTH:  [birth year] radiographic studies discussed above were reviewed by me in detail.       PAST MEDICAL HISTORY:  Extensive and includes severe aortic stenosis, atrial fibrillation, type 2 diabetes mellitus, hypertension, dyslipidemia, colon polyps, primary osteoarthritis o light touch sensation to all areas of the right foot is intact. Mobility exam of the right hip was not carried out given the severity of radiographic findings. LABORATORY DATA:  Admission laboratory studies showed MRSA screening to be negative.   MSSA preoperative hospital stay. I will confer with Cardiology and Dr. Jono Robertson on a regular basis as well. Thank you for alerting me of this patient's admission and orthopedic surgical issues that have prompted this admission. Dictated By Alda Gilliam,

## 2017-01-05 NOTE — PROGRESS NOTES
[facility]    Progress Note    Basil Saleem Patient Status:  Inpatient    1933 MRN [id number]   Location [facility] 1SW Attending Hussein Cifuentes MD   Hosp Day # 2 PCP Félix Bass MD     Subjective:  Pain better controlle Left hip total arthroplasty. 4. Moderately advanced osteoarthritis right hip. 5. Osteitis pubis. 6. Osteoarthritis lower lumbar spine. 7. Demineralization. 8. Atherosclerosis. 9. Distended urinary bladder.  10. A preliminary report was submitted and there i compared with ECG of 01/04/2017 00:43:49 No change Electronically signed on 01/04/2017 at 11:01 by Josh Wakefield 12-lead    1/4/2017  ECG Report  Interpretation  -------------------------- Atrial flutter-fibrillation -Poor R-wave progression -nons heparin sq          Rena Azar MD

## 2017-01-05 NOTE — PROGRESS NOTES
ASSESSMENT/PLAN:     impression:     1.  Right hip fracture post fall  2.  Severe aortic stenosis with chronic atrial fibrillation.  She has known coronary artery disease and now has an elevated troponin with some chest pain last night  3 hypertension  4 Obesity    • Embolism, arterial, leg, right [facility]) November 2014     Severe ischemia RLE s/p right femoral and tibial artery thromboembolectomy   • Recurrent falls      Coumadin stopped 12-16   • Essential hypertension    • Arrhythmia    • Coronary atherosc otherwise negative and/or non-contributory, except as described above.       PHYSICAL EXAM:   /46 mmHg  Pulse 65  Temp(Src) 98.3 °F (36.8 °C) (Oral)  Resp 18  Ht 5' 6\" (1.676 m)  Wt 172 lb (78.019 kg)  BMI 27.77 kg/m2  SpO2 97%  Wt Readings from Last

## 2017-01-05 NOTE — PROGRESS NOTES
PROGRESS NOTE / ORTHO SURG: I SPOKE WITH DR. Hawkins Im THIS AM REGARDING THIS PATIENT. WE DISCUSSED THE FACT THAT RIGHT HIP REPLACEMENT WAS NOT AN URGENT MATTER HERE.  THE PATIENT'S SEVERE RIGHT HIP OA COULD BE MANAGED BY MODIFICATION OF MOBILITY( ie. Aleksander Denton

## 2017-01-06 NOTE — PROGRESS NOTES
[place] [facility] - [facility]    Progress Note    Deo Torres Patient Status:  Inpatient    1933 MRN [id number]   Location [facility] 1SW Attending Linda Benitez MD   [address] Day # 3 PCP Kaelyn Manning MD     Subjective:  Felt dizzy after gett PTT  24.7   --    --    --    --    --    INR  1.0   --    --   1.1   --    --    TROP   --   0.01  0.09*  0.13*   --    --        Xr Pelvis (1 View) (cpt=72170)    1/4/2017  CONCLUSION: 1. No obvious change of December 25, 2016. 2. No acute fracture.  3. there is agreement without major discrepancies. Ekg 12-lead    1/4/2017  ECG Report  Interpretation  -------------------------- Atrial flutter-fibrillation -Poor R-wave progression -nonspecific -consider old anterior infarct.  ABNORMAL RHYTHM When c stenosis  - echo result reviewed  - per cardiology    Chest pain and elevated troponin  - managing medically for now    HTN - con't meds and monitor    Dyslipidemia - statin    Recurrent falls    Macular degeneration with legal blindness    Obesity Body ma

## 2017-01-06 NOTE — PROGRESS NOTES
ASSESSMENT/PLAN:     impression:     1.  Right hip fracture post fall  2.  Severe aortic stenosis with chronic atrial fibrillation.  She has known coronary artery disease and now has an elevated troponin with some chest pain last night  3 hypertension  4 • Macular degeneration      Legal blindness.  Avastin injection   • Obesity    • Embolism, arterial, leg, right [facility]) November 2014     Severe ischemia RLE s/p right femoral and tibial artery thromboembolectomy   • Recurrent falls      Coumadin stopped 12 are noted. Ten point review of systems has been performed and is otherwise negative and/or non-contributory, except as described above.       PHYSICAL EXAM:   /57 mmHg  Pulse 60  Temp(Src) 98 °F (36.7 °C) (Oral)  Resp 18  Ht 5' 6\" (1.676 m)  Wt 172 l

## 2017-01-07 NOTE — PROGRESS NOTES
[facility]  Cardiology Progress Note  TAVR team evaluation    Kin Clonts Patient Status:  Inpatient    1933 MRN [id number]   Location [facility] 4W/SW/SE Attending Mark Moon MD   [facility] Day # 4 PCP Judithe Ormond, MD          1. 1328   Gross per 24 hour   Intake    450 ml   Output      0 ml   Net    450 ml       Last 3 Weights  01/03/17 1959 : 172 lb (78.019 kg)  01/03/17 0930 : 173 lb 12.8 oz (78.835 kg)  12/25/16 1453 : 170 lb 1.6 oz (77.157 kg)  12/25/16 1029 : 174 lb (78.926 k Medications:  diazepam (VALIUM) injection 2.5 mg 2.5 mg Intravenous Q6H PRN   HYDROcodone-acetaminophen (NORCO) 5-325 MG per tab 1 tablet 1 tablet Oral Q6H PRN   Or      HYDROcodone-acetaminophen (NORCO) 5-325 MG per tab 2 tablet 2 tablet Oral Q6H PRN   Mi

## 2017-01-07 NOTE — PROGRESS NOTES
[place] [facility] - [facility]    Progress Note    Deo Torres Patient Status:  Inpatient    1933 MRN [id number]   Location [facility] 1SW Attending Linda Benitez MD   [facility] Day # 4 PCP Kaelyn Manning MD     Subjective:  Felt dizzy after gett 7. Demineralization. 8. Atherosclerosis. 9. Distended urinary bladder. 10. A preliminary report was submitted and there is agreement without major discrepancies. Ct Brain Or Head ([id number])    1/3/2017  CONCLUSION:  1. No acute intracranial process. 12-lead    1/4/2017  ECG Report  Interpretation  -------------------------- Atrial flutter-fibrillation -Poor R-wave progression -nonspecific -consider old anterior infarct.  ABNORMAL RHYTHM When compared with ECG of 01/03/2017 20:54:01 No significant owens full code     DVT proph: heparin sq          Johnie Jameson MD

## 2017-01-07 NOTE — PLAN OF CARE
NEUROLOGICAL - ADULT    • Achieves maximal functionality and self care Not Progressing        PAIN - ADULT    • Verbalizes/displays adequate comfort level or patient's stated pain goal Not Progressing          NEUROLOGICAL - ADULT    • Achieves stable or i

## 2017-01-08 NOTE — PROGRESS NOTES
[place] [facility] - [facility]    Progress Note    Elizabeth Elk Patient Status:  Inpatient    1933 MRN [id number]   Location [facility] 1SW Attending Bartolo Bamberger, MD   [address] Day # 5 PCP Kimberly Reyes MD     Subjective:  Sleeping, not respond spine. 7. Demineralization. 8. Atherosclerosis. 9. Distended urinary bladder. 10. A preliminary report was submitted and there is agreement without major discrepancies. Ct Brain Or Head ([id number])    1/3/2017  CONCLUSION:  1.   No acute intracranial pro 12-lead    1/4/2017  ECG Report  Interpretation  -------------------------- Atrial flutter-fibrillation -Poor R-wave progression -nonspecific -consider old anterior infarct.  ABNORMAL RHYTHM When compared with ECG of 01/03/2017 20:54:01 No significant owens blindness    Obesity Body mass index is 27.77 kg/(m^2).      Goals of care   - DNR status  - hospice care    DVT proph: d/c heparin drip      Spent >35 min      Diego Michelle MD

## 2017-01-08 NOTE — HOSPICE RN NOTE
Hospice initiation visit    Received Referral from intake to have information session with patient family.   Received patient in bed, patient is lethargic, minimally responsive, does not appear to be in any pain or distress at this time as patient just rece

## 2017-01-08 NOTE — PROGRESS NOTES
[facility] AND [facility]  Cardiology Progress Note    Jesus Manuel Vences Patient Status:  Inpatient    1933 MRN [id number]   Location [facility] 4W/SW/SE Attending Rah Alvarez MD   [facility] Day # 5 PCP Octavia Shaw MD        1.  Right hip fracture pos encounter ([facility]      Objective:   Temp: 97.9 °F (36.6 °C)  Pulse: 125  Resp: 20  BP: 135/44 mmHg    Intake/Output:     Intake/Output Summary (Last 24 hours) at 01/08/17 0657  Last data filed at 01/07/17 1328   Gross per 24 hour   Intake    450 ml   Output HYDRATE  Isradipine                  Comment:Other reaction(s): ISRADIPINE  Lisinopril                  Comment:Other reaction(s): LISINOPRIL    Medications:    Current Facility-Administered Medications:  diazepam (VALIUM) injection 5 mg 5 mg Intravenous Q

## 2017-01-08 NOTE — PLAN OF CARE
NEUROLOGICAL - ADULT    • Achieves stable or improved neurological status Not Progressing        PAIN - ADULT    • Verbalizes/displays adequate comfort level or patient's stated pain goal Not Progressing          Patient with increased pain and anxiety thr

## 2017-01-08 NOTE — PLAN OF CARE
PAIN - ADULT    • Verbalizes/displays adequate comfort level or patient's stated pain goal Progressing        Patient Centered Care    • Patient preferences are identified and integrated in the patient's plan of care Progressing          Patient admitted t

## 2017-01-08 NOTE — DISCHARGE SUMMARY
[facility]    Discharge Summary    Anibal Boyer Patient Status:  Inpatient    1933 MRN [id number]   Location [facility] 4W/SW/SE Attending No att. providers found   Hosp Day # 5 PCP Petar Tucker MD     Date of Admissi atraumatic  Neck:  Supple, symmetrical  Cardiac:  irregular  Pulmonary:  Clear to auscultation bilaterally, respirations unlabored  Gastrointestinal:  Soft, non-tender, normal bowel sounds  Musculoskeletal:  No joint swelling  Extremities:  Left leg cold,

## 2017-01-08 NOTE — CONSULTS
[facility]  Vascular Surgery Consultation    Praneeth Patterson Patient Status:  Inpatient    1933 MRN [id number]   Location [facility] 4W/SW/SE Attending Susanne Briceno MD   [facility] Day # 5 PCP Kenyetta Ratliff MD     History of Present Illness posterior descending artery.   The patient has had spinal stenosis, obesity, macular degeneration (with legal blindness), severe hip disease with a left total hip replacement in 2001 and arthroplasty in 2008, and the above-mentioned chronic atrial fibrillat thromboembolectomy    TOTAL HIP REPLACEMENT Left October 2001    Comment LEFT HIP     CHOLECYSTECTOMY      HERNIA SURGERY       Family History   Problem Relation Age of Onset   • Diabetes Mother    • Diabetes Maternal Grandmother    • Stroke Father      Iker Coreas multivitamin with minerals (ADULT) tab 1 tablet, 1 tablet, Oral, Daily  •  Atorvastatin Calcium (LIPITOR) tab 20 mg, 20 mg, Oral, Nightly  •  acetaminophen (TYLENOL) tab 650 mg, 650 mg, Oral, Q6H PRN  •  valsartan (DIOVAN) 320 mg, hydrochlorothiazide (MICR of the leg and posterior lateral thigh consistent with severe ischemia.     Laboratory Data:    Lab Results  Component Value Date   WBC 13.2 01/08/2017   HGB 9.8 01/08/2017   HCT 30.1 01/08/2017    01/08/2017   CREATSERUM 0.98 01/08/2017   BUN 28 01/ chooses hospice care as primary therapy. The patient will be made comfortable. A hospice consultation has been requested. Thank you very much for this consultation.     Jody Maynard MD  1/8/2017  9:27 AM

## 2017-01-08 NOTE — PLAN OF CARE
Patient being switched to inpatient hospice. Had a rough night involving severe pain in left leg, in which no pulses were found via doppler and leg was pale and cold. Stat arterial study also confirmed no pulses. Vascular consult ordered.  Dr. Namita Valdes also

## 2017-01-08 NOTE — DISCHARGE PLANNING
SW received order in regards to hospice. SW sent referral to [facility]. SW called [facility] on call line to inform of referral for possible eval today. SW informed RN of this.     Theresa Share, [address]

## 2017-01-09 NOTE — HOSPICE RN NOTE
Hospice RN Rounding Note    HPI: Kyle Vieira is a(n) 80year old female with a history of severe aortic stenosis and atrial fibrillation with multiple recurrent falls.  She presents after a fall at home.  She had an acute R acetabular fracture.  The etc.    Kahlil Bello RN BSN [facility]  Nurse Liaison, [facility]  [phone] at [address] [place] [address]

## 2017-01-09 NOTE — H&P
[place] [facility] - [facility]    History & Physical    Jeison Hastings Patient Status:  Inpatient    1933 MRN [id number]   Location [facility] 4W/SW/SE Attending Livier Ovalles MD   Hosp Day # 1 PCP Sharon Leija MD     [address] [place] [address] thromboembolectomy   • Recurrent falls      Coumadin stopped 12-16   • Essential hypertension    • Arrhythmia    • Coronary atherosclerosis    • High blood pressure    • High cholesterol    • Diabetes ([address]      diet controlled , and no medicines    • Lamar 3.16*  3.31*   MCV  90.7  90.9   MCH  29.5  29.6   MCHC  32.5  32.5   RDW  15.1*  15.2*     Recent Labs   Lab  01/07/17   0720  01/08/17   0657  01/08/17   0857   BUN  18  28*   --    CREATSERUM  0.56  0.98   --    GFRAA  >60  >60   --    GFRNAA  >60  54*

## 2017-01-10 NOTE — PLAN OF CARE
Pt  at [address]. Resusitation not attempted as pt was DNR.     Time of Death     Family Notified Y Name and Jeanette Downs of [facility] Notified [phone] [id number] [facility]     contacted if applicable [facility]

## 2017-01-10 NOTE — DISCHARGE SUMMARY
[place] [facility] - [facility]    Discharge Summary / Death Note    Durene Drop Patient Status:  Inpatient    1933 MRN [id number]   Location [facility] 4W/SW/SE Attending No att. providers found   Hosp Day # 2 PCP Ayden Amin MD     Da

## (undated) NOTE — LETTER
Hospital Discharge Documentation  Please phone to schedule a hospital follow up appointment.     From: [address] Hospitalist's Office  Phone: [phone]    Patient discharged time/date: 1/8/2017 12:15 PM  Patient discharge disposition:  [address] Given the many complications and high risk of procedures, her family wants to transition her to inpatient hospice.     Consultants     Provider Role    Sakshi Gilliam MD Consulting Physician     Emelina Garcia Physician     Mario Iraheta MD

## (undated) NOTE — Clinical Note
Awaiting new assigned room for pt at this time. Report previously called to Mickey Ramsay, nurse called back and informed of room change.